# Patient Record
Sex: FEMALE
[De-identification: names, ages, dates, MRNs, and addresses within clinical notes are randomized per-mention and may not be internally consistent; named-entity substitution may affect disease eponyms.]

---

## 2019-01-25 ENCOUNTER — HOSPITAL ENCOUNTER (OUTPATIENT)
Dept: HOSPITAL 92 - BICMAMMO | Age: 59
Discharge: HOME | End: 2019-01-25
Attending: FAMILY MEDICINE
Payer: COMMERCIAL

## 2019-01-25 DIAGNOSIS — Z12.31: Primary | ICD-10-CM

## 2019-01-25 PROCEDURE — 77067 SCR MAMMO BI INCL CAD: CPT

## 2019-01-25 PROCEDURE — 77063 BREAST TOMOSYNTHESIS BI: CPT

## 2019-04-11 ENCOUNTER — HOSPITAL ENCOUNTER (OUTPATIENT)
Dept: HOSPITAL 92 - SDC | Age: 59
Discharge: HOME | End: 2019-04-11
Attending: PODIATRIST
Payer: COMMERCIAL

## 2019-04-11 VITALS — BODY MASS INDEX: 41.5 KG/M2

## 2019-04-11 DIAGNOSIS — Z79.899: ICD-10-CM

## 2019-04-11 DIAGNOSIS — E66.01: ICD-10-CM

## 2019-04-11 DIAGNOSIS — Z87.891: ICD-10-CM

## 2019-04-11 DIAGNOSIS — E11.65: ICD-10-CM

## 2019-04-11 DIAGNOSIS — E03.9: ICD-10-CM

## 2019-04-11 DIAGNOSIS — M21.611: Primary | ICD-10-CM

## 2019-04-11 DIAGNOSIS — Z79.4: ICD-10-CM

## 2019-04-11 DIAGNOSIS — E78.5: ICD-10-CM

## 2019-04-11 LAB
ANION GAP SERPL CALC-SCNC: 14 MMOL/L (ref 10–20)
BASOPHILS # BLD AUTO: 0.1 THOU/UL (ref 0–0.2)
BASOPHILS NFR BLD AUTO: 1 % (ref 0–1)
BUN SERPL-MCNC: 13 MG/DL (ref 9.8–20.1)
CALCIUM SERPL-MCNC: 9.5 MG/DL (ref 7.8–10.44)
CHLORIDE SERPL-SCNC: 107 MMOL/L (ref 98–107)
CO2 SERPL-SCNC: 22 MMOL/L (ref 22–29)
CREAT CL PREDICTED SERPL C-G-VRATE: 152 ML/MIN (ref 70–130)
EOSINOPHIL # BLD AUTO: 0.1 THOU/UL (ref 0–0.7)
EOSINOPHIL NFR BLD AUTO: 1 % (ref 0–10)
GLUCOSE SERPL-MCNC: 92 MG/DL (ref 70–105)
HGB BLD-MCNC: 13.4 G/DL (ref 12–16)
LYMPHOCYTES # BLD: 2.2 THOU/UL (ref 1.2–3.4)
LYMPHOCYTES NFR BLD AUTO: 33.1 % (ref 21–51)
MCH RBC QN AUTO: 30.4 PG (ref 27–31)
MCV RBC AUTO: 88.9 FL (ref 78–98)
MONOCYTES # BLD AUTO: 0.5 THOU/UL (ref 0.11–0.59)
MONOCYTES NFR BLD AUTO: 7.3 % (ref 0–10)
NEUTROPHILS # BLD AUTO: 3.8 THOU/UL (ref 1.4–6.5)
NEUTROPHILS NFR BLD AUTO: 57.7 % (ref 42–75)
PLATELET # BLD AUTO: 283 THOU/UL (ref 130–400)
POTASSIUM SERPL-SCNC: 3.9 MMOL/L (ref 3.5–5.1)
RBC # BLD AUTO: 4.4 MILL/UL (ref 4.2–5.4)
SODIUM SERPL-SCNC: 139 MMOL/L (ref 136–145)
WBC # BLD AUTO: 6.5 THOU/UL (ref 4.8–10.8)

## 2019-04-11 PROCEDURE — 0QSN04Z REPOSITION RIGHT METATARSAL WITH INTERNAL FIXATION DEVICE, OPEN APPROACH: ICD-10-PCS | Performed by: PODIATRIST

## 2019-04-11 PROCEDURE — 36415 COLL VENOUS BLD VENIPUNCTURE: CPT

## 2019-04-11 PROCEDURE — 76000 FLUOROSCOPY <1 HR PHYS/QHP: CPT

## 2019-04-11 PROCEDURE — 85025 COMPLETE CBC W/AUTO DIFF WBC: CPT

## 2019-04-11 PROCEDURE — C1713 ANCHOR/SCREW BN/BN,TIS/BN: HCPCS

## 2019-04-11 PROCEDURE — 80048 BASIC METABOLIC PNL TOTAL CA: CPT

## 2019-04-11 PROCEDURE — S0020 INJECTION, BUPIVICAINE HYDRO: HCPCS

## 2019-04-11 NOTE — OP
DATE OF PROCEDURE:  04/11/2019



PREOPERATIVE DIAGNOSIS:  Bunion, right foot.



POSTOPERATIVE DIAGNOSIS:  Bunion, right foot.



PROCEDURES PERFORMED:  Jhony bunionectomy, right foot with screw fixation.



ANESTHESIA:  General with local foot block.



HEMOSTASIS:  Pneumatic ankle tourniquet at 250 mmHg.



ESTIMATED BLOOD LOSS:  Less than 5 mL.



MATERIALS:  2-0 Vicryl, 4-0 Monocryl, and 4-0 Prolene.



INJECTABLE:  20 mL of 0.5% Marcaine plain.



DESCRIPTION OF PROCEDURE:  The patient was brought into the operating room, placed

on the operating table in supine position.  A well-padded pneumatic ankle tourniquet

was placed about the patient's right ankle.  Following administration of IV

anesthesia, local foot block was given utilizing 20 mL of 0.5% Marcaine plain.  The

foot was then scrubbed, prepped, and draped in the usual aseptic manner.  Esmarch

bandage was used to exsanguinate the patient's right foot and the pneumatic ankle

tourniquet was then inflated to 250 mmHg, which provided adequate hemostasis

throughout the entire procedure.  Next, attention was then directed to the dorsal

aspect of the patient's first metatarsophalangeal joint, where a 5 cm linear

longitudinal incision was made over the dorsal aspect of the patient's first

metatarsophalangeal joint.  The incision was deepened into the subcuticular

structures with care being taken to redirect all vital neurovascular structures.

All bleeders were cauterized as necessary.  Next, a T-type capsulotomy was performed

at the head of the first metatarsal, exposing the head of the first metatarsal and

base of the proximal phalanx.  Next, the medial prominence was then carefully

resected and passed from the operative field.  A Chevron type osteotomy was

performed, the head of the first metatarsal shifting into more corrected lateral

position, the adductor tendon was carefully released from its attachment to base of

the proximal phalanx.  Fluoroscopy was used to assure proper alignment and

correction, and all were noted to be correct at this time.  Utilizing temporary

fixation, a K-wire was driven across the osteotomy site.  Next, a Synthes headless

compression screw was placed across the osteotomy site from dorsal proximal to

plantar distal.  Next, the remaining medial bone shelf was then carefully resected

and passed from the operative field.  The wound was irrigated with copious amounts

of sterile normal saline and  mixture.   The capsular structures were

reapproximated and coapted utilizing 2-0 Vicryl and the skin was closed utilizing

4-0 Monocryl in a running subcuticular sutures pattern.  skin was closed utilizing

4-0 Prolene in interlocking suture pattern.  A light compressive dressing was placed

about the patient's right foot and the pneumatic ankle tourniquet was released with

prompt hyperemic response noted to all digits of the right foot. 



DISCHARGE SUMMARY:  The patient tolerated the procedure and anesthesia and was

transferred to the recovery room with vital signs stable and vascular status intact

to all digits of the right foot.  Following a period of postoperative monitoring,

the patient is to be discharged home.  Should she have any problems prior to the

first postoperative appointment, she is advised to call and will be seen within 1

week of the procedure. 







Job ID:  [id number]

## 2019-04-11 NOTE — RAD
RIGHT FOOT 3 VIEWS:

 

Date:  04/11/19

 

HISTORY:  

Status post bunionectomy. 

 

FINDINGS:

Postoperative changes are noted of the right great toe, consistent with history of prior bunionectomy
, with an internal fixation screw stabilizing the distal first metatarsal. Minimal focal soft tissue 
swelling. 

 

IMPRESSION: 

Recent status post bunionectomy changes. 

 

 

POS: OFF